# Patient Record
Sex: FEMALE | Race: ASIAN | URBAN - METROPOLITAN AREA
[De-identification: names, ages, dates, MRNs, and addresses within clinical notes are randomized per-mention and may not be internally consistent; named-entity substitution may affect disease eponyms.]

---

## 2019-08-09 ENCOUNTER — NEW PATIENT (OUTPATIENT)
Dept: URBAN - METROPOLITAN AREA CLINIC 30 | Facility: CLINIC | Age: 44
End: 2019-08-09
Payer: COMMERCIAL

## 2019-08-09 PROCEDURE — 92004 COMPRE OPH EXAM NEW PT 1/>: CPT | Performed by: OPTOMETRIST

## 2019-08-09 RX ORDER — CYCLOSPORINE 0.5 MG/ML
0.05 % EMULSION OPHTHALMIC
Qty: 180 | Refills: 2 | Status: INACTIVE
Start: 2019-08-09 | End: 2021-01-27

## 2019-08-09 ASSESSMENT — KERATOMETRY
OS: 35.33
OD: 36.55

## 2019-08-09 ASSESSMENT — VISUAL ACUITY
OD: 20/40
OS: 20/50

## 2019-08-09 ASSESSMENT — INTRAOCULAR PRESSURE
OS: 16
OD: 18

## 2021-01-27 ENCOUNTER — FOLLOW UP ESTABLISHED (OUTPATIENT)
Dept: URBAN - METROPOLITAN AREA CLINIC 30 | Facility: CLINIC | Age: 46
End: 2021-01-27
Payer: COMMERCIAL

## 2021-01-27 DIAGNOSIS — H43.393 OTHER VITREOUS OPACITIES, BILATERAL: ICD-10-CM

## 2021-01-27 DIAGNOSIS — Z98.890 OTHER SPECIFIED POSTPROCEDURAL STATES: ICD-10-CM

## 2021-01-27 PROCEDURE — 92134 CPTRZ OPH DX IMG PST SGM RTA: CPT | Performed by: OPTOMETRIST

## 2021-01-27 PROCEDURE — 83861 MICROFLUID ANALY TEARS: CPT | Performed by: OPTOMETRIST

## 2021-01-27 PROCEDURE — 92014 COMPRE OPH EXAM EST PT 1/>: CPT | Performed by: OPTOMETRIST

## 2021-01-27 RX ORDER — PREDNISOLONE ACETATE 10 MG/ML
1 % SUSPENSION/ DROPS OPHTHALMIC
Qty: 5 | Refills: 0 | Status: INACTIVE
Start: 2021-01-27 | End: 2021-03-04

## 2021-01-27 ASSESSMENT — KERATOMETRY
OS: 34.88
OD: 36.51

## 2021-01-27 ASSESSMENT — INTRAOCULAR PRESSURE
OD: 17
OS: 17

## 2021-01-27 ASSESSMENT — VISUAL ACUITY
OD: 20/30
OS: 20/30

## 2021-03-04 ENCOUNTER — FOLLOW UP ESTABLISHED (OUTPATIENT)
Dept: URBAN - METROPOLITAN AREA CLINIC 30 | Facility: CLINIC | Age: 46
End: 2021-03-04
Payer: COMMERCIAL

## 2021-03-04 DIAGNOSIS — H00.021 HORDEOLUM INTERNUM (MEIBOMIAN GLAND DYSFUNCTION), RIGHT UPPER LID: ICD-10-CM

## 2021-03-04 PROCEDURE — 83861 MICROFLUID ANALY TEARS: CPT | Performed by: OPTOMETRIST

## 2021-03-04 PROCEDURE — 0330T TEAR FILM IMG UNI/BI W/I&R: CPT | Performed by: OPTOMETRIST

## 2021-03-04 RX ORDER — PREDNISOLONE ACETATE 10 MG/ML
1 % SUSPENSION/ DROPS OPHTHALMIC
Qty: 5 | Refills: 0 | Status: INACTIVE
Start: 2021-03-04 | End: 2021-05-12

## 2021-03-04 RX ORDER — LIFITEGRAST 50 MG/ML
5 % SOLUTION/ DROPS OPHTHALMIC
Qty: 180 | Refills: 6 | Status: INACTIVE
Start: 2021-03-04 | End: 2021-05-12

## 2021-03-04 ASSESSMENT — INTRAOCULAR PRESSURE
OD: 18
OS: 16

## 2021-05-12 ENCOUNTER — PROCEDURE (OUTPATIENT)
Dept: URBAN - METROPOLITAN AREA CLINIC 30 | Facility: CLINIC | Age: 46
End: 2021-05-12

## 2021-05-12 NOTE — IMPRESSION/PLAN
Impression: Hordeolum internum (Meibomian gland dysfunction), right upper lid Plan: 1st IPL today, see other notes.

## 2021-05-12 NOTE — IMPRESSION/PLAN
Impression: Keratoconjunctivitis sicca, bilateral
01/2021 OSMO 301,305
03/2021 [place] 325, 292
03/2021 [place] 7, 5 Plan: 1st IPL today. Pt notes some improvement OU. Using Systane AT's qid OU-continue. O3's. Avoid ceiling fans. Cont Restasis BID OU-notes burning, consider refrigeration and use AT's after. Finished PA 1% bid OU. 

03/4/2021 BLL 0.5mm OASIS Mini placed DEC 03/2021. Lipiview/ transillumination results indicate OD 60%, OS 60% MG atrophy. Discussed options for treatment such as IPL x 4 in order to maintain MG function. Pt aware of out of pocket cost $350.

## 2021-06-09 ENCOUNTER — OFFICE VISIT (OUTPATIENT)
Dept: URBAN - METROPOLITAN AREA CLINIC 30 | Facility: CLINIC | Age: 46
End: 2021-06-09

## 2021-06-09 NOTE — IMPRESSION/PLAN
Impression: Keratoconjunctivitis sicca, bilateral
01/2021 OSMO 301,305
03/2021 [place]sm[facility] 325, 292
03/2021 [place] 7, 5 Plan: 2nd IPL today. Pt notes some improvement OU. Using Systane AT's qid OU-continue. O3's. Avoid ceiling fans. Cont Restasis BID OU-notes burning, consider refrigeration and use AT's after. Finished PA 1% bid OU. 

03/4/2021 BLL 0.5mm OASIS Mini placed DEC 03/2021. Lipiview/ transillumination results indicate OD 60%, OS 60% MG atrophy. Discussed options for treatment such as IPL x 4 in order to maintain MG function. Pt aware of out of pocket cost $350.

## 2021-06-09 NOTE — IMPRESSION/PLAN
Impression: Hordeolum internum (Meibomian gland dysfunction), right upper lid Plan: 2nd IPL today, see other notes.

## 2021-07-07 ENCOUNTER — OFFICE VISIT (OUTPATIENT)
Dept: URBAN - METROPOLITAN AREA CLINIC 30 | Facility: CLINIC | Age: 46
End: 2021-07-07

## 2021-07-07 RX ORDER — CYCLOSPORINE 0.5 MG/ML
0.05 % EMULSION OPHTHALMIC
Qty: 180 | Refills: 6 | Status: ACTIVE
Start: 2021-07-07

## 2021-07-07 NOTE — IMPRESSION/PLAN
Impression: Keratoconjunctivitis sicca, bilateral
01/2021 OSMO 301,305
03/2021 [place]sm[facility] 325, 292
03/2021 [place] 7, 5 Plan: 3rd IPL today. Pt notes some improvement OU. Using Systane AT's qid OU-continue. O3's. Avoid ceiling fans. Cont Restasis BID OU-notes burning, consider refrigeration and use AT's after. Finished PA 1% bid OU. 

03/4/2021 BLL 0.5mm OASIS Mini placed DEC 03/2021. Lipiview/ transillumination results indicate OD 60%, OS 60% MG atrophy. Discussed options for treatment such as IPL x 4 in order to maintain MG function. Pt aware of out of pocket cost $350.

## 2021-07-07 NOTE — IMPRESSION/PLAN
Impression: Hordeolum internum (Meibomian gland dysfunction), right upper lid Plan: 3rd IPL today, see other notes.

## 2021-08-04 ENCOUNTER — OFFICE VISIT (OUTPATIENT)
Dept: URBAN - METROPOLITAN AREA CLINIC 30 | Facility: CLINIC | Age: 46
End: 2021-08-04
Payer: COMMERCIAL

## 2021-08-04 DIAGNOSIS — H16.223 KERATOCONJUNCTIVITIS SICCA, NOT SPECIFIED AS SJOGREN'S, BILATERAL: Primary | ICD-10-CM

## 2021-08-04 PROCEDURE — 68761 CLOSE TEAR DUCT OPENING: CPT | Performed by: OPTOMETRIST

## 2021-08-04 NOTE — IMPRESSION/PLAN
Impression: Keratoconjunctivitis sicca, bilateral
01/2021 OSMO 301,305
03/2021 Osmo 325, 292
03/2021 [place] 7, 5 Plan: 4th IPL today. Pt notes some improvement OU. Using Systane AT's qid OU-continue. O3's. Avoid ceiling fans. Cont Restasis BID OU-notes burning, consider refrigeration and use AT's after. Finished PA 1% bid OU. 

03/4/2021 BLL 0.5mm OASIS Mini placed 8/2021; RLL missing today, 0.4mm Ultra placed DEC 03/2021. Lipiview/ transillumination results indicate OD 60%, OS 60% MG atrophy. Discussed options for treatment such as IPL x 4 in order to maintain MG function. Pt aware of out of pocket cost $350.

## 2021-08-05 ENCOUNTER — OFFICE VISIT (OUTPATIENT)
Dept: URBAN - METROPOLITAN AREA CLINIC 30 | Facility: CLINIC | Age: 46
End: 2021-08-05
Payer: COMMERCIAL

## 2021-08-05 PROCEDURE — 99213 OFFICE O/P EST LOW 20 MIN: CPT | Performed by: OPTOMETRIST

## 2021-08-05 RX ORDER — PREDNISOLONE ACETATE 10 MG/ML
1 % SUSPENSION/ DROPS OPHTHALMIC
Qty: 5 | Refills: 0 | Status: INACTIVE
Start: 2021-08-05 | End: 2021-08-13

## 2021-08-05 ASSESSMENT — INTRAOCULAR PRESSURE
OS: 16
OD: 17

## 2021-08-05 NOTE — IMPRESSION/PLAN
Impression: Keratoconjunctivitis sicca, bilateral
01/2021 OSMO 301,305
03/2021 [place]sm[place] 325, 292
03/2021 [place] 7, 5 Plan: Some inflammation noted OD. Monitor plug but previous plug no issues. 4th IPL 8/2021. Pt notes some improvement OU. Using Systane AT's qid OU-continue. O3's. Avoid ceiling fans. Cont Restasis BID OU-notes burning, consider refrigeration and use AT's after. PA 1% bid OU. 

03/4/2021 BLL 0.5mm OASIS Mini placed 8/2021; 0.4mm Ultra placed DEC 03/2021. Lipiview/ transillumination results indicate OD 60%, OS 60% MG atrophy. Discussed options for treatment such as IPL x 4 in order to maintain MG function. Pt aware of out of pocket cost $350.

## 2021-08-13 ENCOUNTER — OFFICE VISIT (OUTPATIENT)
Dept: URBAN - METROPOLITAN AREA CLINIC 30 | Facility: CLINIC | Age: 46
End: 2021-08-13
Payer: COMMERCIAL

## 2021-08-13 PROCEDURE — 99213 OFFICE O/P EST LOW 20 MIN: CPT | Performed by: OPTOMETRIST

## 2021-08-13 ASSESSMENT — INTRAOCULAR PRESSURE
OS: 15
OD: 15

## 2021-08-13 NOTE — IMPRESSION/PLAN
Impression: Keratoconjunctivitis sicca, bilateral
01/2021 OSMO 301,305
03/2021 Osmo 325, 292
03/2021 [place] 7, 5 Plan: Pt came in as ER today due to FBS and irritation. Monitor plug but previous plug no issues. PEK noted OU. pt reassurance to continue PA 1% for 6 more weeks. Consider plug removal if hyperemia persists. 4th IPL 8/2021. Using Systane Ultra AT's qid OU-gave samples of [employer]. O3's. Avoid ceiling fans. Cont Restasis BID OU-notes burning, consider refrigeration and use AT's after. 03/4/2021 BLL 0.5mm OASIS Mini placed 8/2021; 0.4mm Ultra placed RLL

DEC 03/2021. Lipiview/ transillumination results indicate OD 60%, OS 60% MG atrophy. Discussed options for treatment such as IPL x 4 in order to maintain MG function. Pt aware of out of pocket cost $350.

## 2021-09-22 ENCOUNTER — OFFICE VISIT (OUTPATIENT)
Dept: URBAN - METROPOLITAN AREA CLINIC 30 | Facility: CLINIC | Age: 46
End: 2021-09-22
Payer: COMMERCIAL

## 2021-09-22 PROCEDURE — 99214 OFFICE O/P EST MOD 30 MIN: CPT | Performed by: OPTOMETRIST

## 2021-09-22 ASSESSMENT — INTRAOCULAR PRESSURE
OS: 18
OD: 18

## 2021-09-22 NOTE — IMPRESSION/PLAN
Impression: Keratoconjunctivitis sicca, bilateral
01/2021 OSMO 301,305
03/2021 [place]sm[place] 325, 292
03/2021 [place] 7, 5 Plan: Redness/ Hyperemia persists. PEK noted OU. Finish PA 1% for 6 more weeks. 4th IPL 8/2021. Using Systane Ultra AT's qid OU- recommend switching to PFATs. O3's. Avoid ceiling fans. Cont Restasis BID OU-notes burning, consider refrigeration and use AT's after. *Removed plugs 9/2021 to see if improves redness. 03/4/2021 BLL 0.5mm OASIS Mini placed 8/2021; 0.4mm Ultra placed RLL

DEC 03/2021. Lipiview/ transillumination results indicate OD 60%, OS 60% MG atrophy. Discussed options for treatment such as IPL x 4 in order to maintain MG function. Pt aware of out of pocket cost $350.

## 2021-11-02 ENCOUNTER — OFFICE VISIT (OUTPATIENT)
Dept: URBAN - METROPOLITAN AREA CLINIC 30 | Facility: CLINIC | Age: 46
End: 2021-11-02
Payer: COMMERCIAL

## 2021-11-02 PROCEDURE — 99213 OFFICE O/P EST LOW 20 MIN: CPT | Performed by: OPTOMETRIST

## 2021-11-02 ASSESSMENT — KERATOMETRY
OD: 36.27
OS: 34.96

## 2021-11-02 ASSESSMENT — VISUAL ACUITY
OD: 20/40
OS: 20/50

## 2021-11-02 NOTE — IMPRESSION/PLAN
Impression: Keratoconjunctivitis sicca, bilateral
01/2021 OSMO 301,305
03/2021 Osmo 325, 292
03/2021 [place] 7, 5 Plan: Redness/ Hyperemia improved. PEK noted OU. Finished PA 1% for 6 more weeks. 4th IPL 8/2021. Using Systane Ultra AT's qid OU- recommend switching to PFATs. O3's. Avoid ceiling fans. Cont Restasis BID OU-still notes burning, consider refrigeration and use AT's after. Pt notes pinkish/redness nasally OU, will monitor. Consider Lumify PRN OU. Will consider replacing plugs BLL next visit. RTC 3 months FU w refraction. *Removed plugs 9/2021 to see if improves redness. 03/4/2021 BLL 0.5mm OASIS Mini placed 8/2021; 0.4mm Ultra placed RLL

DEC 03/2021. Lipiview/ transillumination results indicate OD 60%, OS 60% MG atrophy. Discussed options for treatment such as IPL x 4 in order to maintain MG function. Pt aware of out of pocket cost $350.

## 2022-02-02 ENCOUNTER — OFFICE VISIT (OUTPATIENT)
Dept: URBAN - METROPOLITAN AREA CLINIC 30 | Facility: CLINIC | Age: 47
End: 2022-02-02
Payer: COMMERCIAL

## 2022-02-02 PROCEDURE — 99213 OFFICE O/P EST LOW 20 MIN: CPT | Performed by: OPTOMETRIST

## 2022-02-02 ASSESSMENT — INTRAOCULAR PRESSURE
OD: 13
OS: 13

## 2022-02-02 ASSESSMENT — KERATOMETRY
OD: 36.80
OS: 35.20

## 2022-02-02 ASSESSMENT — VISUAL ACUITY
OD: 20/25
OS: 20/25

## 2022-02-02 NOTE — IMPRESSION/PLAN
Impression: Hordeolum internum (Meibomian gland dysfunction), right upper lid Plan: See other notes.

## 2022-06-01 ENCOUNTER — OFFICE VISIT (OUTPATIENT)
Dept: URBAN - METROPOLITAN AREA CLINIC 30 | Facility: CLINIC | Age: 47
End: 2022-06-01
Payer: COMMERCIAL

## 2022-06-01 DIAGNOSIS — Z98.890 OTHER SPECIFIED POSTPROCEDURAL STATES: ICD-10-CM

## 2022-06-01 DIAGNOSIS — Z79.84 LONG TERM (CURRENT) USE OF ORAL ANTIDIABETIC DRUGS: ICD-10-CM

## 2022-06-01 DIAGNOSIS — E11.9 DIABETES MELLITUS TYPE 2 WITHOUT MENTION OF COMPLICATION: Primary | ICD-10-CM

## 2022-06-01 DIAGNOSIS — H43.393 OTHER VITREOUS OPACITIES, BILATERAL: ICD-10-CM

## 2022-06-01 DIAGNOSIS — H16.223 KERATOCONJUNCTIVITIS SICCA, NOT SPECIFIED AS SJOGREN'S, BILATERAL: ICD-10-CM

## 2022-06-01 DIAGNOSIS — H00.021 HORDEOLUM INTERNUM (MEIBOMIAN GLAND DYSFUNCTION), RIGHT UPPER LID: ICD-10-CM

## 2022-06-01 PROCEDURE — 92134 CPTRZ OPH DX IMG PST SGM RTA: CPT | Performed by: OPTOMETRIST

## 2022-06-01 PROCEDURE — 92014 COMPRE OPH EXAM EST PT 1/>: CPT | Performed by: OPTOMETRIST

## 2022-06-01 ASSESSMENT — KERATOMETRY
OD: 36.84
OS: 36.22

## 2022-06-01 ASSESSMENT — INTRAOCULAR PRESSURE
OD: 14
OS: 13

## 2022-06-01 ASSESSMENT — VISUAL ACUITY
OD: 20/20
OS: 20/30

## 2022-06-01 NOTE — IMPRESSION/PLAN
Impression: Diabetes mellitus Type 2 without mention of complication: X23.8. Plan: No diabetic retinopathy. Recommend yearly diabetic eye exam. Discussed with patient importance of good blood sugar control. A1C 5.7 x 3 months ago, per pt.

## 2022-06-01 NOTE — IMPRESSION/PLAN
Impression: Keratoconjunctivitis sicca, bilateral
01/2021 OSMO 301,305
03/2021 [place] 325, 292
03/2021 [place] 7, 5 Plan: Improving/stable. Pt notes redness at nasal canthus OU is better. 4th IPL 8/2021. Using Systane Ultra AT's qid OU- recommend switching to PFATs. O3's. Avoid ceiling fans. Continue Restasis BID OU-still notes burning, consider refrigeration and use AT's after. Consider Lumify PRN OU. Will consider replacing plugs BLL in future. *Removed plugs 9/2021 to see if improves redness. 03/4/2021 BLL 0.5mm OASIS Mini placed 8/2021; 0.4mm Ultra placed RLL

DEC 03/2021. Lipiview/ transillumination results indicate OD 60%, OS 60% MG atrophy. Discussed options for treatment such as IPL x 4 in order to maintain MG function. Pt aware of out of pocket cost $350.

## 2022-06-01 NOTE — IMPRESSION/PLAN
Impression: Other specified postprocedural states: Z98.890. Plan: S/P RK. Some limitation BCVA OS. Generated new SRX today.

## 2022-06-01 NOTE — IMPRESSION/PLAN
Impression: Hordeolum internum (Meibomian gland dysfunction), right upper lid Plan: See other notes for correlations.

## 2022-08-11 ENCOUNTER — OFFICE VISIT (OUTPATIENT)
Dept: URBAN - METROPOLITAN AREA CLINIC 30 | Facility: CLINIC | Age: 47
End: 2022-08-11
Payer: COMMERCIAL

## 2022-08-11 DIAGNOSIS — H52.4 PRESBYOPIA: Primary | ICD-10-CM

## 2022-08-11 PROCEDURE — V2799 MISC VISION ITEM OR SERVICE: HCPCS | Performed by: OPTOMETRIST

## 2022-08-11 ASSESSMENT — VISUAL ACUITY
OS: 20/30
OD: 20/30

## 2022-08-11 ASSESSMENT — INTRAOCULAR PRESSURE
OD: 12
OS: 12

## 2022-08-11 ASSESSMENT — KERATOMETRY
OS: 35.01
OD: 36.14

## 2022-08-11 NOTE — IMPRESSION/PLAN
Impression: Presbyopia: H52.4. Plan: Pt here for a glasses check. Generated new SRX to match old MR-- pt preference.

## 2023-01-03 ENCOUNTER — OFFICE VISIT (OUTPATIENT)
Dept: URBAN - METROPOLITAN AREA CLINIC 30 | Facility: CLINIC | Age: 48
End: 2023-01-03
Payer: COMMERCIAL

## 2023-01-03 DIAGNOSIS — H16.223 KERATOCONJUNCTIVITIS SICCA, NOT SPECIFIED AS SJOGREN'S, BILATERAL: Primary | ICD-10-CM

## 2023-01-03 PROCEDURE — 99213 OFFICE O/P EST LOW 20 MIN: CPT | Performed by: OPTOMETRIST

## 2023-01-03 PROCEDURE — 83861 MICROFLUID ANALY TEARS: CPT | Performed by: OPTOMETRIST

## 2023-01-03 RX ORDER — CYCLOSPORINE 0.5 MG/ML
0.05 % EMULSION OPHTHALMIC
Qty: 180 | Refills: 6 | Status: ACTIVE
Start: 2023-01-03

## 2023-01-03 NOTE — IMPRESSION/PLAN
Impression: Keratoconjunctivitis sicca, bilateral
01/2021 OSMO 301,305
03/2021 [place] 325, 292
03/2021 [place] 7, 5
12/2022 OSMO 314, 314  Plan: OD>OS. Improving/stable. Pt notes fluctuation in [place]. Pt notes redness at nasal canthus OU is better. 4th IPL 8/2021. Using Systane Ultra AT's OU- recommend switching to and increasing PFATs. O3's. Avoid ceiling fans. Continue Restasis BID OU ($300/month for brand name, will send off as generic today)-still notes burning, consider refrigeration and use AT's after. Consider Lumify PRN OU. Will consider replacing plugs BLL in future. *Removed plugs 9/2021 to see if improves redness. 03/4/2021 BLL 0.5mm OASIS Mini placed 8/2021; 0.4mm Ultra placed RLL

DEC 03/2021. Lipiview/ transillumination results indicate OD 60%, OS 60% MG atrophy. Discussed options for treatment such as IPL x 4 in order to maintain MG function. Pt aware of out of pocket cost $350.

## 2023-06-15 ENCOUNTER — OFFICE VISIT (OUTPATIENT)
Dept: URBAN - METROPOLITAN AREA CLINIC 30 | Facility: CLINIC | Age: 48
End: 2023-06-15
Payer: COMMERCIAL

## 2023-06-15 DIAGNOSIS — H00.021 HORDEOLUM INTERNUM (MEIBOMIAN GLAND DYSFUNCTION), RIGHT UPPER LID: ICD-10-CM

## 2023-06-15 DIAGNOSIS — H43.393 OTHER VITREOUS OPACITIES, BILATERAL: ICD-10-CM

## 2023-06-15 DIAGNOSIS — H52.4 PRESBYOPIA: ICD-10-CM

## 2023-06-15 DIAGNOSIS — H16.223 KERATOCONJUNCTIVITIS SICCA, NOT SPECIFIED AS SJOGREN'S, BILATERAL: ICD-10-CM

## 2023-06-15 DIAGNOSIS — Z98.890 OTHER SPECIFIED POSTPROCEDURAL STATES: ICD-10-CM

## 2023-06-15 DIAGNOSIS — E11.9 DIABETES MELLITUS TYPE 2 WITHOUT MENTION OF COMPLICATION: Primary | ICD-10-CM

## 2023-06-15 DIAGNOSIS — Z79.84 LONG TERM (CURRENT) USE OF ORAL ANTIDIABETIC DRUGS: ICD-10-CM

## 2023-06-15 PROCEDURE — 92014 COMPRE OPH EXAM EST PT 1/>: CPT | Performed by: OPTOMETRIST

## 2023-06-15 PROCEDURE — 83861 MICROFLUID ANALY TEARS: CPT | Performed by: OPTOMETRIST

## 2023-06-15 PROCEDURE — 92134 CPTRZ OPH DX IMG PST SGM RTA: CPT | Performed by: OPTOMETRIST

## 2023-06-15 ASSESSMENT — VISUAL ACUITY
OD: 20/30
OS: 20/30

## 2023-06-15 ASSESSMENT — INTRAOCULAR PRESSURE
OD: 14
OS: 14

## 2023-06-15 ASSESSMENT — KERATOMETRY: OD: 35.50

## 2023-06-15 NOTE — IMPRESSION/PLAN
Impression: Other vitreous opacities, bilateral: H43.393. Plan: No evidence of RD or tear noted. All signs and risks of retinal detachment or tears were discussed in detail. If pt. notices any symptoms discussed, contact office ASAP. Recommend pt. return for normal recall. See MAC OCT- stable.

## 2023-06-15 NOTE — IMPRESSION/PLAN
Impression: Presbyopia: H52.4. Plan: Previous spec RX was generated match old MR-- pt preference. Verified old RX matches last SRX from 8/11/2022. New and old RX should be equivalent. Pt feels new RX creates eye strain.

## 2023-06-15 NOTE — IMPRESSION/PLAN
Impression: Keratoconjunctivitis sicca, bilateral
OSMO 280, 313 6/2023 03/2021 [place] 7, 5 Plan: OD>OS. OS tearing. Blurry VA in the mornings. Improving/stable. Pt notes fluctuation in [address] [facility]. Pt notes redness at nasal canthus OU is better. 4th IPL 8/2021. Consider Lumify PRN OU. Will consider replacing plugs BLL in future. PLAN: Using Systane Ultra AT's OU- recommend switching to and increasing PFATs. O3's. Avoid ceiling fans. Continue Restasis BID OU ($300/month for brand name, will send off as generic today)-still notes burning, consider refrigeration and use AT's after. *Removed plugs 9/2021 to see if improves redness. 8/2021; 0.4mm Ultra placed RLL. DEC 03/2021. Lipiview/ transillumination results indicate OD 60%, OS 60% MG atrophy. Discussed options for treatment such as IPL x 4 in order to maintain MG function. Pt aware of out of pocket cost $350.

## 2023-06-15 NOTE — IMPRESSION/PLAN
Impression: Diabetes mellitus Type 2 without mention of complication: V99.6. Plan: No diabetic retinopathy OU. No Diabetic Macular Edema noted OU. Recommend yearly diabetic eye exam. Emphasized importance of strict BS control, diet, exercise, and BP control to avoid risk of loss of vision. Recommend regular, ongoing follow-ups with their PCP to monitor DM as well. Most recent A1C UNKNOWN, per pt.

## 2023-06-15 NOTE — IMPRESSION/PLAN
Impression: Long term (current) use of oral antidiabetic drugs: Z79.84. Plan: See other DM plan for correlations.

## 2023-06-23 ENCOUNTER — OFFICE VISIT (OUTPATIENT)
Dept: URBAN - METROPOLITAN AREA CLINIC 30 | Facility: CLINIC | Age: 48
End: 2023-06-23
Payer: COMMERCIAL

## 2023-06-23 DIAGNOSIS — H16.223 KERATOCONJUNCTIVITIS SICCA, NOT SPECIFIED AS SJOGREN'S, BILATERAL: Primary | ICD-10-CM

## 2023-06-23 DIAGNOSIS — H52.4 PRESBYOPIA: ICD-10-CM

## 2023-06-23 ASSESSMENT — KERATOMETRY
OS: 35.49
OD: 36.53

## 2023-06-23 ASSESSMENT — VISUAL ACUITY
OD: 20/40
OS: 20/40

## 2023-06-23 NOTE — IMPRESSION/PLAN
Impression: Keratoconjunctivitis sicca, bilateral
OSMO 280, 313 6/2023 03/2021 [place] 7, 5 Plan: Itching, FBS, redness. OD>OS. OS tearing. Blurry VA in the mornings. Improving/stable. Pt notes fluctuation in [address] [facility]. Pt notes redness at nasal canthus OU is better. 4th IPL 8/2021. Consider Lumify PRN OU. Will consider replacing plugs BLL in future. PLAN: Using Systane Ultra AT's OU- recommend switching to and increasing PFATs. O3's. Avoid ceiling fans. Continue Restasis BID OU ($300/month for brand name, will send off as generic today)-still notes burning, consider refrigeration and use AT's after. *Removed plugs 9/2021 to see if improves redness. 8/2021; 0.4mm Ultra placed RLL. DEC 03/2021. Lipiview/ transillumination results indicate OD 60%, OS 60% MG atrophy. Discussed options for treatment such as IPL x 4 in order to maintain MG function. Pt aware of out of pocket cost $350.

## 2023-06-23 NOTE — IMPRESSION/PLAN
Impression: Presbyopia: H52.4. Plan: Previous [facility] was generated matched old MR-- pt preference. Verified old RX matches last SRX from 8/11/2022. New and old RX should be equivalent.  Will re-generate with glasses rx from 8/11/2022 with slight change and with +1.25 add

## 2023-09-28 ENCOUNTER — OFFICE VISIT (OUTPATIENT)
Dept: URBAN - METROPOLITAN AREA CLINIC 30 | Facility: CLINIC | Age: 48
End: 2023-09-28
Payer: COMMERCIAL

## 2023-09-28 DIAGNOSIS — H16.223 KERATOCONJUNCTIVITIS SICCA, NOT SPECIFIED AS SJOGREN'S, BILATERAL: Primary | ICD-10-CM

## 2023-09-28 DIAGNOSIS — H52.4 PRESBYOPIA: ICD-10-CM

## 2023-09-28 DIAGNOSIS — H00.021 HORDEOLUM INTERNUM (MEIBOMIAN GLAND DYSFUNCTION), RIGHT UPPER LID: ICD-10-CM

## 2023-09-28 PROCEDURE — 83861 MICROFLUID ANALY TEARS: CPT | Performed by: OPTOMETRIST

## 2023-09-28 PROCEDURE — 99213 OFFICE O/P EST LOW 20 MIN: CPT | Performed by: OPTOMETRIST

## 2023-09-28 RX ORDER — CYCLOSPORINE 0.5 MG/ML
0.05 % EMULSION OPHTHALMIC
Qty: 180 | Refills: 6 | Status: ACTIVE
Start: 2023-09-28

## 2023-09-28 ASSESSMENT — INTRAOCULAR PRESSURE
OS: 14
OD: 14

## 2024-02-01 ENCOUNTER — OFFICE VISIT (OUTPATIENT)
Dept: URBAN - METROPOLITAN AREA CLINIC 30 | Facility: CLINIC | Age: 49
End: 2024-02-01
Payer: COMMERCIAL

## 2024-02-01 DIAGNOSIS — H00.021 HORDEOLUM INTERNUM (MEIBOMIAN GLAND DYSFUNCTION), RIGHT UPPER LID: ICD-10-CM

## 2024-02-01 DIAGNOSIS — H16.223 KERATOCONJUNCTIVITIS SICCA, NOT SPECIFIED AS SJOGREN'S, BILATERAL: Primary | ICD-10-CM

## 2024-02-01 PROCEDURE — 99214 OFFICE O/P EST MOD 30 MIN: CPT | Performed by: OPTOMETRIST

## 2024-02-01 PROCEDURE — 83861 MICROFLUID ANALY TEARS: CPT | Performed by: OPTOMETRIST

## 2024-02-01 RX ORDER — PERFLUOROHEXYLOCTANE 1 MG/MG
100 % SOLUTION OPHTHALMIC
Qty: 3 | Refills: 12 | Status: ACTIVE
Start: 2024-02-01

## 2024-02-01 RX ORDER — CYCLOSPORINE 0.5 MG/ML
0.05 % EMULSION OPHTHALMIC
Qty: 180 | Refills: 6 | Status: INACTIVE
Start: 2024-02-01 | End: 2024-02-05

## 2024-02-01 ASSESSMENT — INTRAOCULAR PRESSURE
OD: 12
OS: 12

## 2024-05-01 ENCOUNTER — OFFICE VISIT (OUTPATIENT)
Dept: URBAN - METROPOLITAN AREA CLINIC 30 | Facility: CLINIC | Age: 49
End: 2024-05-01
Payer: COMMERCIAL

## 2024-05-01 DIAGNOSIS — Z79.84 LONG TERM (CURRENT) USE OF ORAL ANTIDIABETIC DRUGS: ICD-10-CM

## 2024-05-01 DIAGNOSIS — H00.021 HORDEOLUM INTERNUM (MEIBOMIAN GLAND DYSFUNCTION), RIGHT UPPER LID: ICD-10-CM

## 2024-05-01 DIAGNOSIS — R73.03 PREDIABETES: ICD-10-CM

## 2024-05-01 DIAGNOSIS — H43.393 OTHER VITREOUS OPACITIES, BILATERAL: ICD-10-CM

## 2024-05-01 PROCEDURE — 99213 OFFICE O/P EST LOW 20 MIN: CPT | Performed by: OPTOMETRIST

## 2024-05-01 PROCEDURE — 92134 CPTRZ OPH DX IMG PST SGM RTA: CPT | Performed by: OPTOMETRIST

## 2024-05-01 ASSESSMENT — KERATOMETRY
OD: 36.70
OS: 34.84

## 2024-05-01 ASSESSMENT — VISUAL ACUITY
OD: 20/30
OS: 20/30

## 2024-05-01 ASSESSMENT — INTRAOCULAR PRESSURE
OS: 14
OD: 14

## 2024-05-02 ENCOUNTER — POST-OPERATIVE VISIT (OUTPATIENT)
Dept: URBAN - METROPOLITAN AREA CLINIC 30 | Facility: CLINIC | Age: 49
End: 2024-05-02
Payer: COMMERCIAL

## 2024-05-02 DIAGNOSIS — Z48.810 ENCOUNTER FOR SURGICAL AFTERCARE FOLLOWING SURGERY ON A SENSE ORGAN: ICD-10-CM

## 2024-05-02 DIAGNOSIS — H16.223 KERATOCONJUNCTIVITIS SICCA, NOT SPECIFIED AS SJOGREN'S, BILATERAL: Primary | ICD-10-CM

## 2024-05-02 PROCEDURE — 99024 POSTOP FOLLOW-UP VISIT: CPT | Performed by: OPTOMETRIST

## 2024-05-09 ENCOUNTER — OFFICE VISIT (OUTPATIENT)
Dept: URBAN - METROPOLITAN AREA CLINIC 30 | Facility: CLINIC | Age: 49
End: 2024-05-09
Payer: COMMERCIAL

## 2024-05-09 DIAGNOSIS — H16.223 KERATOCONJUNCTIVITIS SICCA, NOT SPECIFIED AS SJOGREN'S, BILATERAL: Primary | ICD-10-CM

## 2024-05-09 PROCEDURE — 99214 OFFICE O/P EST MOD 30 MIN: CPT | Performed by: OPTOMETRIST

## 2024-05-09 RX ORDER — PREDNISOLONE ACETATE 10 MG/ML
1 % SUSPENSION/ DROPS OPHTHALMIC
Qty: 5 | Refills: 0 | Status: ACTIVE
Start: 2024-05-09

## 2024-05-09 ASSESSMENT — INTRAOCULAR PRESSURE
OD: 14
OS: 13